# Patient Record
Sex: FEMALE | Race: WHITE | Employment: FULL TIME | URBAN - METROPOLITAN AREA
[De-identification: names, ages, dates, MRNs, and addresses within clinical notes are randomized per-mention and may not be internally consistent; named-entity substitution may affect disease eponyms.]

---

## 2018-11-13 ENCOUNTER — HOSPITAL ENCOUNTER (EMERGENCY)
Facility: CLINIC | Age: 23
Discharge: HOME | End: 2018-11-13
Attending: PHYSICIAN ASSISTANT | Admitting: PHYSICIAN ASSISTANT
Payer: COMMERCIAL

## 2018-11-13 VITALS
TEMPERATURE: 98.1 F | SYSTOLIC BLOOD PRESSURE: 111 MMHG | DIASTOLIC BLOOD PRESSURE: 62 MMHG | RESPIRATION RATE: 16 BRPM | OXYGEN SATURATION: 100 % | HEIGHT: 61 IN | WEIGHT: 165 LBS | BODY MASS INDEX: 31.15 KG/M2

## 2018-11-13 DIAGNOSIS — F32.A DEPRESSION, UNSPECIFIED DEPRESSION TYPE: ICD-10-CM

## 2018-11-13 DIAGNOSIS — Z72.89 SELF-INJURIOUS BEHAVIOR: ICD-10-CM

## 2018-11-13 LAB
ALBUMIN SERPL-MCNC: 3.7 G/DL (ref 3.4–5)
ALP SERPL-CCNC: 63 U/L (ref 40–150)
ALT SERPL W P-5'-P-CCNC: 17 U/L (ref 0–50)
AMPHETAMINES UR QL SCN: NEGATIVE
ANION GAP SERPL CALCULATED.3IONS-SCNC: 5 MMOL/L (ref 3–14)
APAP SERPL-MCNC: <2 MG/L (ref 10–20)
AST SERPL W P-5'-P-CCNC: 17 U/L (ref 0–45)
BARBITURATES UR QL: NEGATIVE
BASOPHILS # BLD AUTO: 0.1 10E9/L (ref 0–0.2)
BASOPHILS NFR BLD AUTO: 0.8 %
BENZODIAZ UR QL: NEGATIVE
BILIRUB SERPL-MCNC: 0.4 MG/DL (ref 0.2–1.3)
BUN SERPL-MCNC: 9 MG/DL (ref 7–30)
CALCIUM SERPL-MCNC: 8.4 MG/DL (ref 8.5–10.1)
CANNABINOIDS UR QL SCN: POSITIVE
CHLORIDE SERPL-SCNC: 110 MMOL/L (ref 94–109)
CO2 SERPL-SCNC: 28 MMOL/L (ref 20–32)
COCAINE UR QL: NEGATIVE
CREAT SERPL-MCNC: 0.84 MG/DL (ref 0.52–1.04)
DIFFERENTIAL METHOD BLD: NORMAL
EOSINOPHIL # BLD AUTO: 0.3 10E9/L (ref 0–0.7)
EOSINOPHIL NFR BLD AUTO: 3.9 %
ERYTHROCYTE [DISTWIDTH] IN BLOOD BY AUTOMATED COUNT: 12.4 % (ref 10–15)
GFR SERPL CREATININE-BSD FRML MDRD: 84 ML/MIN/1.7M2
GLUCOSE SERPL-MCNC: 91 MG/DL (ref 70–99)
HCG UR QL: NEGATIVE
HCT VFR BLD AUTO: 39.9 % (ref 35–47)
HGB BLD-MCNC: 13.1 G/DL (ref 11.7–15.7)
IMM GRANULOCYTES # BLD: 0 10E9/L (ref 0–0.4)
IMM GRANULOCYTES NFR BLD: 0.2 %
LYMPHOCYTES # BLD AUTO: 1.4 10E9/L (ref 0.8–5.3)
LYMPHOCYTES NFR BLD AUTO: 20.8 %
MCH RBC QN AUTO: 30.5 PG (ref 26.5–33)
MCHC RBC AUTO-ENTMCNC: 32.8 G/DL (ref 31.5–36.5)
MCV RBC AUTO: 93 FL (ref 78–100)
MONOCYTES # BLD AUTO: 0.5 10E9/L (ref 0–1.3)
MONOCYTES NFR BLD AUTO: 7.7 %
NEUTROPHILS # BLD AUTO: 4.3 10E9/L (ref 1.6–8.3)
NEUTROPHILS NFR BLD AUTO: 66.6 %
NRBC # BLD AUTO: 0 10*3/UL
NRBC BLD AUTO-RTO: 0 /100
OPIATES UR QL SCN: NEGATIVE
PCP UR QL SCN: NEGATIVE
PLATELET # BLD AUTO: 255 10E9/L (ref 150–450)
POTASSIUM SERPL-SCNC: 3.9 MMOL/L (ref 3.4–5.3)
PROT SERPL-MCNC: 6.8 G/DL (ref 6.8–8.8)
RBC # BLD AUTO: 4.29 10E12/L (ref 3.8–5.2)
SALICYLATES SERPL-MCNC: <2 MG/DL
SODIUM SERPL-SCNC: 143 MMOL/L (ref 133–144)
TSH SERPL DL<=0.005 MIU/L-ACNC: 0.9 MU/L (ref 0.4–4)
WBC # BLD AUTO: 6.5 10E9/L (ref 4–11)

## 2018-11-13 PROCEDURE — 90791 PSYCH DIAGNOSTIC EVALUATION: CPT

## 2018-11-13 PROCEDURE — 90715 TDAP VACCINE 7 YRS/> IM: CPT | Performed by: PHYSICIAN ASSISTANT

## 2018-11-13 PROCEDURE — 25000128 H RX IP 250 OP 636: Performed by: PHYSICIAN ASSISTANT

## 2018-11-13 PROCEDURE — 80053 COMPREHEN METABOLIC PANEL: CPT | Performed by: PHYSICIAN ASSISTANT

## 2018-11-13 PROCEDURE — 99285 EMERGENCY DEPT VISIT HI MDM: CPT | Mod: 25 | Performed by: PHYSICIAN ASSISTANT

## 2018-11-13 PROCEDURE — 80175 DRUG SCREEN QUAN LAMOTRIGINE: CPT | Performed by: PHYSICIAN ASSISTANT

## 2018-11-13 PROCEDURE — 85025 COMPLETE CBC W/AUTO DIFF WBC: CPT | Performed by: PHYSICIAN ASSISTANT

## 2018-11-13 PROCEDURE — 84443 ASSAY THYROID STIM HORMONE: CPT | Performed by: PHYSICIAN ASSISTANT

## 2018-11-13 PROCEDURE — 80329 ANALGESICS NON-OPIOID 1 OR 2: CPT | Performed by: PHYSICIAN ASSISTANT

## 2018-11-13 PROCEDURE — 99285 EMERGENCY DEPT VISIT HI MDM: CPT | Mod: Z6 | Performed by: PHYSICIAN ASSISTANT

## 2018-11-13 PROCEDURE — 80307 DRUG TEST PRSMV CHEM ANLYZR: CPT | Performed by: PHYSICIAN ASSISTANT

## 2018-11-13 PROCEDURE — 90471 IMMUNIZATION ADMIN: CPT | Performed by: PHYSICIAN ASSISTANT

## 2018-11-13 PROCEDURE — 81025 URINE PREGNANCY TEST: CPT | Performed by: PHYSICIAN ASSISTANT

## 2018-11-13 RX ORDER — SERTRALINE HYDROCHLORIDE 100 MG/1
100 TABLET, FILM COATED ORAL DAILY
COMMUNITY

## 2018-11-13 RX ORDER — LAMOTRIGINE 100 MG/1
100 TABLET ORAL DAILY
COMMUNITY

## 2018-11-13 RX ADMIN — CLOSTRIDIUM TETANI TOXOID ANTIGEN (FORMALDEHYDE INACTIVATED), CORYNEBACTERIUM DIPHTHERIAE TOXOID ANTIGEN (FORMALDEHYDE INACTIVATED), BORDETELLA PERTUSSIS TOXOID ANTIGEN (GLUTARALDEHYDE INACTIVATED), BORDETELLA PERTUSSIS FILAMENTOUS HEMAGGLUTININ ANTIGEN (FORMALDEHYDE INACTIVATED), BORDETELLA PERTUSSIS PERTACTIN ANTIGEN, AND BORDETELLA PERTUSSIS FIMBRIAE 2/3 ANTIGEN 0.5 ML: 5; 2; 2.5; 5; 3; 5 INJECTION, SUSPENSION INTRAMUSCULAR at 13:35

## 2018-11-13 ASSESSMENT — ENCOUNTER SYMPTOMS
NEUROLOGICAL NEGATIVE: 1
RESPIRATORY NEGATIVE: 1
AGITATION: 0
HALLUCINATIONS: 0
GASTROINTESTINAL NEGATIVE: 1
CARDIOVASCULAR NEGATIVE: 1
CONSTITUTIONAL NEGATIVE: 1
MUSCULOSKELETAL NEGATIVE: 1

## 2018-11-13 NOTE — ED NOTES
Pt moved to room 5 for safety room and continues to be on watch.  Report handed over to Radhika BARBOZA RN

## 2018-11-13 NOTE — ED AVS SNAPSHOT
[facility] Emergency Department    [address] [address]    Phone:  [phone]    Fax:  [phone]                                       Naz Reed   MRN: [id number]    Department:  [facility] Emergency Department   Date of Visit:  11/13/2018           After Visit Summary Signature Page     I have received my discharge instructions, and my questions have been answered. I have discussed any challenges I see with this plan with the nurse or doctor.    ..........................................................................................................................................  Patient/Patient Representative Signature      ..........................................................................................................................................  Patient Representative Print Name and Relationship to Patient    ..................................................               ................................................  Date                                   Time    ..........................................................................................................................................  Reviewed by Signature/Title    ...................................................              ..............................................  Date                                               Time          22EPIC Rev 08/18

## 2018-11-13 NOTE — ED PROVIDER NOTES
"  History     Chief Complaint   Patient presents with     Suicidal     hx depression-suicidal ideation lately-has been hospitalized in past after suicide attempt     HPI  Naz Reed is a 23 year old female who presents with complaints of increasing suicidal ideation over the past 2 weeks.  Patient has history of PTSD and depression for which she takes Lamictal and Zoloft.  She states she skipped a couple days of taking her medications but took her doses again last night along with 1 of her father's \"sleeping pills\" to help her sleep.  Patient states she cut her left wrist but states this was not a suicide attempt.  She states that she has history of self-injurious behavior as cutting helps her cope and deal with her thoughts of depression.  She complains of intermittent thoughts of suicidal ideation but denies a plan at this time.  Patient reports that her triggers are a recent divorce.  She recently moved back into her father's house from [place].  Patient reportedly spoke to her ex- 2 nights ago and this may have been a trigger.  Patient states she has been hospitalized once in the past when she was 14 years old for mental health admission after a suicide attempt.  Denies hallucinations, homicidal ideation, fevers, chills, cough, sore throat, sinus pressure, nasal congestion, nausea, vomiting, diarrhea, abdominal pain, chest pain, shortness of breath, or urinary symptoms.  Patient's friend who is a nurse cleaned patient's left wrist laceration and applied Steri-Strips.  Patient denies any other medical problems.  She does not take any other daily medications.  Patient admits to marijuana use and occasional alcohol use.  She denies tobacco use.  Pt reports she cut herself around 5 PM last evening.  Tetanus needs updating.  Patient states she was followed by a psychiatrist when she was living in [place] but has not establish care since moving home 2 weeks ago.      Problem List:    There are no active " "problems to display for this patient.       Past Medical History:    No past medical history on file.    Past Surgical History:    No past surgical history on file.    Family History:    No family history on file.    Social History:  Marital Status:    Social History   Substance Use Topics     Smoking status: Not on file     Smokeless tobacco: Not on file     Alcohol use Not on file        Medications:      lamoTRIgine (LAMICTAL) 100 MG tablet   sertraline (ZOLOFT) 100 MG tablet         Review of Systems   Constitutional: Negative.    Respiratory: Negative.    Cardiovascular: Negative.    Gastrointestinal: Negative.    Musculoskeletal: Negative.    Skin: Negative.    Neurological: Negative.    Psychiatric/Behavioral: Positive for self-injury and suicidal ideas. Negative for agitation and hallucinations.   All other systems reviewed and are negative.      Physical Exam   BP: 110/75  Heart Rate: 85  Temp: 98.1  F (36.7  C)  Resp: 16  Height: 154.9 cm (5' 1\")  Weight: 74.8 kg (165 lb)  SpO2: 98 %      Physical Exam   Constitutional: She is oriented to person, place, and time. She appears well-developed and well-nourished. No distress.   HENT:   Head: Normocephalic and atraumatic.   Mouth/Throat: Oropharynx is clear and moist.   Eyes: Conjunctivae and EOM are normal. Pupils are equal, round, and reactive to light.   Neck: Normal range of motion. Neck supple.   Cardiovascular: Normal rate, regular rhythm and normal heart sounds.    Pulmonary/Chest: Effort normal and breath sounds normal. No respiratory distress. She has no wheezes. She has no rales.   Abdominal: Soft. She exhibits no distension. There is no tenderness. There is no rebound and no guarding.   Musculoskeletal: Normal range of motion. She exhibits no edema, tenderness or deformity.        Left wrist: She exhibits laceration. She exhibits normal range of motion, no tenderness, no bony tenderness, no swelling, no effusion, no crepitus and no deformity. "   Linear laceration to volar aspect of left wrist with Steri-strips in place.  Pt has full ROM of her wrist.  Sensation and strength intact.   Neurological: She is alert and oriented to person, place, and time. She has normal strength. No cranial nerve deficit or sensory deficit. She exhibits normal muscle tone. Coordination and gait normal. GCS eye subscore is 4. GCS verbal subscore is 5. GCS motor subscore is 6.   Skin: Skin is warm and dry. No rash noted.   Psychiatric: Her speech is normal and behavior is normal. Judgment normal. Thought content is not paranoid and not delusional. Cognition and memory are normal. She exhibits a depressed mood. She expresses suicidal (intermittent thoughts) ideation. She expresses no homicidal ideation. She expresses no suicidal plans and no homicidal plans.       ED Course     ED Course     Procedures    Results for orders placed or performed during the hospital encounter of 11/13/18 (from the past 24 hour(s))   Drug abuse screen urine   Result Value Ref Range    Amphetamine Qual Urine Negative NEG^Negative    Barbiturates Qual Urine Negative NEG^Negative    Benzodiazepine Qual Urine Negative NEG^Negative    Cannabinoids Qual Urine Positive (A) NEG^Negative    Cocaine Qual Urine Negative NEG^Negative    Opiates Qualitative Urine Negative NEG^Negative    PCP Qual Urine Negative NEG^Negative   HCG qualitative urine (UPT)   Result Value Ref Range    HCG Qual Urine Negative NEG^Negative   CBC with platelets differential   Result Value Ref Range    WBC 6.5 4.0 - 11.0 10e9/L    RBC Count 4.29 3.8 - 5.2 10e12/L    Hemoglobin 13.1 11.7 - 15.7 g/dL    Hematocrit 39.9 35.0 - 47.0 %    MCV 93 78 - 100 fl    MCH 30.5 26.5 - 33.0 pg    MCHC 32.8 31.5 - 36.5 g/dL    RDW 12.4 10.0 - 15.0 %    Platelet Count 255 150 - 450 10e9/L    Diff Method Automated Method     % Neutrophils 66.6 %    % Lymphocytes 20.8 %    % Monocytes 7.7 %    % Eosinophils 3.9 %    % Basophils 0.8 %    % Immature  "Granulocytes 0.2 %    Nucleated RBCs 0 0 /100    Absolute Neutrophil 4.3 1.6 - 8.3 10e9/L    Absolute Lymphocytes 1.4 0.8 - 5.3 10e9/L    Absolute Monocytes 0.5 0.0 - 1.3 10e9/L    Absolute Eosinophils 0.3 0.0 - 0.7 10e9/L    Absolute Basophils 0.1 0.0 - 0.2 10e9/L    Abs Immature Granulocytes 0.0 0 - 0.4 10e9/L    Absolute Nucleated RBC 0.0    Comprehensive metabolic panel   Result Value Ref Range    Sodium 143 133 - 144 mmol/L    Potassium 3.9 3.4 - 5.3 mmol/L    Chloride 110 (H) 94 - 109 mmol/L    Carbon Dioxide 28 20 - 32 mmol/L    Anion Gap 5 3 - 14 mmol/L    Glucose 91 70 - 99 mg/dL    Urea Nitrogen 9 7 - 30 mg/dL    Creatinine 0.84 0.52 - 1.04 mg/dL    GFR Estimate 84 >60 mL/min/1.7m2    GFR Estimate If Black >90 >60 mL/min/1.7m2    Calcium 8.4 (L) 8.5 - 10.1 mg/dL    Bilirubin Total 0.4 0.2 - 1.3 mg/dL    Albumin 3.7 3.4 - 5.0 g/dL    Protein Total 6.8 6.8 - 8.8 g/dL    Alkaline Phosphatase 63 40 - 150 U/L    ALT 17 0 - 50 U/L    AST 17 0 - 45 U/L   TSH with free T4 reflex   Result Value Ref Range    TSH 0.90 0.40 - 4.00 mU/L   Acetaminophen level   Result Value Ref Range    Acetaminophen Level <2 mg/L   Salicylate level   Result Value Ref Range    Salicylate Level <2 mg/dL       Medications   Tdap (tetanus-diphtheria-acell pertussis) (ADACEL) injection 0.5 mL (not administered)       Assessments & Plan (with Medical Decision Making)     Pt is a 23 year old female who presents with complaints of increasing suicidal ideation over the past 2 weeks.  Patient has history of PTSD and depression for which she takes Lamictal and Zoloft.  She states she skipped a couple days of taking her medications but took her doses again last night along with 1 of her father's \"sleeping pills\" to help her sleep.  Patient states she cut her left wrist but states this was not a suicide attempt.  She states that she has history of self-injurious behavior as cutting helps her cope and deal with her thoughts of depression.  She " complains of intermittent thoughts of suicidal ideation but denies a plan at this time.  Patient reports that her triggers are a recent divorce.  She recently moved back into her father's house from [place].  Patient reportedly spoke to her ex- 2 nights ago and this may have been a trigger.  Patient states she has been hospitalized once in the past when she was 14 years old for mental health admission after a suicide attempt.  Patient denies any other medical problems.  She does not take any other daily medications.  Patient admits to marijuana use and occasional alcohol use.  Patient states she was followed by a psychiatrist when she was living in [place] but has not establish care since moving home 2 weeks ago.  Pt is afebrile on arrival.  Exam as above.  Lab work was obtained and was unremarkable.  Lamotrigine level is currently pending.  Urine drug screen was positive for cannabinoids.  I discussed with DEC , Dana Coreas at 12:09 PM.  She subsequently evaluated patient.  She states after thorough evaluation and discussion, she believes patient could be appropriately treated as an outpatient as patient is committed to a plan of following-up closely with [facility] in [place] for her behavioral therapy and further psychiatric follow-up.  She will not be alone as she is currently living with her father and stepmother.  Patient's father and stepmother also feel comfortable with patient discharging home.  Patient signed a safety plan and contract and has a plan to closely follow-up.  Believe this is appropriate management as patient is straightforward in reporting that she is not currently suicidal and does not have an active plan.  Patient is also very straightforward in reporting that she knows she has had worsening depression and thinks this has been triggered by her recent divorce and knows she would benefit from psychiatric and behavioral help but feels that an inpatient  stay may actually not be as beneficial for her.  Patient will also not be alone as she is currently living with her father and stepmother and they feel that this is a safe and appropriate plan for patient as well.  Patient also denies that her self-injurious behavior of cutting was not a suicide attempt but was rather a way that she nino and deals with her thoughts of depression.  Patient was provided with follow-up resources and phone numbers and instructed to return immediately to the emergency department should she have worsening thoughts of suicide or should she have a plan.  Patient and her family expressed understanding of this and agreed with the plan.  Return precautions were reviewed.  Hand-outs were provided.    Patient was instructed to follow-up with [facility] as discussed above for continued care and management.  She is to return to the ED for persistent and/or worsening symptoms.  Patient expressed understanding of the diagnosis and plan and was discharged home in good condition.    I have reviewed the nursing notes.    I have reviewed the findings, diagnosis, plan and follow-up with the patient and her family.    New Prescriptions    No medications on file       Final diagnoses:   Suicidal ideation       11/13/2018   [facility] EMERGENCY DEPARTMENT      Disclaimer:  This note consists of symbols derived from keyboarding, dictation and/or voice recognition software.  As a result, there may be errors in the script that have gone undetected.  Please consider this when interpreting information found in this chart.     Janey Funez PA-C  11/13/18 1726       Janey Funez PA-C  11/13/18 1728

## 2018-11-13 NOTE — ED NOTES
bandaid removed from left wrist area, where the patient had cut herself last night. The patient has several superficial lacerations to the left inner wrist, they are all dressed with steri strips.

## 2018-11-13 NOTE — ED NOTES
Pt brought in by father.   Pt reports increased depression last night with cutting left wrist.  Pt's friend is a nurse and cleaned left wrist laceration and applied steri strips.   Pt calm and cooperative at this time with father present in room.   Father reports pt is going through divorce and recently moved back home and spoke to ex  2 nights ago that may have triggered suicidal attempt.  Pt also has not taken medications the last few days and father had pt take depression medications last night.   Security called and pt placed on Health officer Hold.   Maris DUBOIS RN  Brought pt to restroom for urine sample and is changing pt into orange scrubs with pt's cooperation.  Explained care of plan with pt and father

## 2018-11-13 NOTE — ED AVS SNAPSHOT
[facility] Emergency Department    [address] [address]    Phone:  [phone]    Fax:  [phone]                                       Naz Reed   MRN: [id number]    Department:  [facility] Emergency Department   Date of Visit:  11/13/2018           Patient Information     Date Of Birth          1995        Your diagnoses for this visit were:     Depression, unspecified depression type     Self-injurious behavior        You were seen by Janey Funez PA-C.      Follow-up Information     Schedule an appointment as soon as possible for a visit with [facility].    Contact information:    Gwen Lozada  [phone]        Follow up with [facility] Emergency Department.    Specialty:  EMERGENCY MEDICINE    Why:  As needed, If symptoms worsen    Contact information:    [address] [place] [address] [phone]    Additional information:    The medical center is located at   [address] (between [facility] and   [address] in [place], [place] miles north   of [place]).      24 Hour Appointment Hotline       To make an appointment at any [facility] clinic, call [phone] ([phone]). If you don't have a family doctor or clinic, we will help you find one. [facility] clinics are conveniently located to serve the needs of you and your family.             Review of your medicines      Our records show that you are taking the medicines listed below. If these are incorrect, please call your family doctor or clinic.        Dose / Directions Last dose taken    lamoTRIgine 100 MG tablet   Commonly known as:  LaMICtal   Dose:  100 mg        Take 100 mg by mouth daily   Refills:  0        sertraline 100 MG tablet   Commonly known as:  ZOLOFT   Dose:  100 mg        Take 100 mg by mouth daily   Refills:  0                Procedures and tests performed during your visit     Acetaminophen level    CBC with platelets differential     Comprehensive metabolic panel    Drug abuse screen urine    HCG qualitative urine (UPT)    Lamotrigine Level    Salicylate level    TSH with free T4 reflex      Orders Needing Specimen Collection     None      Pending Results     Date and Time Order Name Status Description    11/13/2018 1125 Lamotrigine Level In process             Pending Culture Results     No orders found from 11/11/2018 to 11/14/2018.            Pending Results Instructions     If you had any lab results that were not finalized at the time of your Discharge, you can call the ED Lab Result RN at [phone]. You will be contacted by this team for any positive Lab results or changes in treatment. The nurses are available 7 days a week from 10A to 6:30P.  You can leave a message 24 hours per day and they will return your call.        Test Results From Your Hospital Stay        11/13/2018 12:01 PM      Component Results     Component Value Ref Range & Units Status    WBC 6.5 4.0 - 11.0 10e9/L Final    RBC Count 4.29 3.8 - 5.2 10e12/L Final    Hemoglobin 13.1 11.7 - 15.7 g/dL Final    Hematocrit 39.9 35.0 - 47.0 % Final    MCV 93 78 - 100 fl Final    MCH 30.5 26.5 - 33.0 pg Final    MCHC 32.8 31.5 - 36.5 g/dL Final    RDW 12.4 10.0 - 15.0 % Final    Platelet Count 255 150 - 450 10e9/L Final    Diff Method Automated Method  Final    % Neutrophils 66.6 % Final    % Lymphocytes 20.8 % Final    % Monocytes 7.7 % Final    % Eosinophils 3.9 % Final    % Basophils 0.8 % Final    % Immature Granulocytes 0.2 % Final    Nucleated RBCs 0 0 /100 Final    Absolute Neutrophil 4.3 1.6 - 8.3 10e9/L Final    Absolute Lymphocytes 1.4 0.8 - 5.3 10e9/L Final    Absolute Monocytes 0.5 0.0 - 1.3 10e9/L Final    Absolute Eosinophils 0.3 0.0 - 0.7 10e9/L Final    Absolute Basophils 0.1 0.0 - 0.2 10e9/L Final    Abs Immature Granulocytes 0.0 0 - 0.4 10e9/L Final    Absolute Nucleated RBC 0.0  Final         11/13/2018 12:13 PM      Component Results     Component Value Ref  Range & Units Status    Sodium 143 133 - 144 mmol/L Final    Potassium 3.9 3.4 - 5.3 mmol/L Final    Chloride 110 (H) 94 - 109 mmol/L Final    Carbon Dioxide 28 20 - 32 mmol/L Final    Anion Gap 5 3 - 14 mmol/L Final    Glucose 91 70 - 99 mg/dL Final    Urea Nitrogen 9 7 - 30 mg/dL Final    Creatinine 0.84 0.52 - 1.04 mg/dL Final    GFR Estimate 84 >60 mL/min/1.7m2 Final    Non  GFR Calc    GFR Estimate If Black >90 >60 mL/min/1.7m2 Final    African American GFR Calc    Calcium 8.4 (L) 8.5 - 10.1 mg/dL Final    Bilirubin Total 0.4 0.2 - 1.3 mg/dL Final    Albumin 3.7 3.4 - 5.0 g/dL Final    Protein Total 6.8 6.8 - 8.8 g/dL Final    Alkaline Phosphatase 63 40 - 150 U/L Final    ALT 17 0 - 50 U/L Final    AST 17 0 - 45 U/L Final         11/13/2018 12:18 PM      Component Results     Component Value Ref Range & Units Status    TSH 0.90 0.40 - 4.00 mU/L Final         11/13/2018 11:49 AM      Component Results     Component Value Ref Range & Units Status    Amphetamine Qual Urine Negative NEG^Negative Final    Cutoff for a negative amphetamine is 500 ng/mL or less.    Barbiturates Qual Urine Negative NEG^Negative Final    Cutoff for a negative barbiturate is 200 ng/mL or less.    Benzodiazepine Qual Urine Negative NEG^Negative Final    Cutoff for a negative benzodiazepine is 200 ng/mL or less.    Cannabinoids Qual Urine Positive (A) NEG^Negative Final    Cutoff for a positive cannabinoid is greater than 50 ng/mL. This is an   unconfirmed screening result to be used for medical purposes only.      Cocaine Qual Urine Negative NEG^Negative Final    Cutoff for a negative cocaine is 300 ng/mL or less.    Opiates Qualitative Urine Negative NEG^Negative Final    Cutoff for a negative opiate is 300 ng/mL or less.    PCP Qual Urine Negative NEG^Negative Final    Cutoff for a negative PCP is 25 ng/mL or less.         11/13/2018 12:58 PM      Component Results     Component Value Ref Range & Units Status     "Acetaminophen Level <2 mg/L Final    Therapeutic range: 10-20 mg/L         2018 12:40 PM      Component Results     Component Value Ref Range & Units Status    Salicylate Level <2 mg/dL Final    Therapeutic:        <20  Anti inflammatory:  15-30           2018 11:44 AM      Component Results     Component Value Ref Range & Units Status    HCG Qual Urine Negative NEG^Negative Final    This test is for screening purposes.  Results should be interpreted along with   the clinical picture.  Confirmation testing is available if warranted by   ordering [id number], HCG Quantitative Pregnancy.           2018 11:42 AM                Thank you for choosing [facility]       Thank you for choosing [facility] for your care. Our goal is always to provide you with excellent care. Hearing back from our patients is one way we can continue to improve our services. Please take a few minutes to complete the written survey that you may receive in the mail after you visit with us. Thank you!        [employer] Information     [employer] lets you send messages to your doctor, view your test results, renew your prescriptions, schedule appointments and more. To sign up, go to [email or web address] . Click on \"Log in\" on the left side of the screen, which will take you to the Welcome page. Then click on \"Sign up Now\" on the right side of the page.     You will be asked to enter the access code listed below, as well as some personal information. Please follow the directions to create your username and password.     Your access code is: [email or web address]  Expires: 2019  2:23 PM     Your access code will  in 90 days. If you need help or a new code, please call your [facility] clinic or [phone].        Care EveryWhere ID     This is your Care EveryWhere ID. This could be used by other organizations to access your [facility] medical records  [id number]        Equal Access to Services     SHIRIN JONES AH: Yolanda Mehta, " dilip kelly, cameron ngo. So [facility] [phone].    ATENCIÓN: Si habla español, tiene a zhang disposición servicios gratuitos de asistencia lingüística. Llame al [phone].    We comply with applicable federal civil rights laws and [place] laws. We do not discriminate on the basis of race, color, national origin, age, disability, sex, sexual orientation, or gender identity.            After Visit Summary       This is your record. Keep this with you and show to your community pharmacist(s) and doctor(s) at your next visit.

## 2018-11-14 LAB — LAMOTRIGINE SERPL-MCNC: 1.5 UG/ML (ref 2.5–15)
